# Patient Record
Sex: MALE | Race: WHITE | Employment: FULL TIME | URBAN - METROPOLITAN AREA
[De-identification: names, ages, dates, MRNs, and addresses within clinical notes are randomized per-mention and may not be internally consistent; named-entity substitution may affect disease eponyms.]

---

## 2021-06-21 ENCOUNTER — HOSPITAL ENCOUNTER (OUTPATIENT)
Age: 36
Discharge: LEFT BEFORE DISCHARGE | End: 2021-06-21
Payer: COMMERCIAL

## 2021-06-21 ENCOUNTER — HOSPITAL ENCOUNTER (EMERGENCY)
Facility: HOSPITAL | Age: 36
Discharge: HOME | End: 2021-06-21
Attending: PEDIATRICS
Payer: COMMERCIAL

## 2021-06-21 VITALS
DIASTOLIC BLOOD PRESSURE: 79 MMHG | BODY MASS INDEX: 29.8 KG/M2 | SYSTOLIC BLOOD PRESSURE: 123 MMHG | WEIGHT: 220 LBS | HEIGHT: 72 IN | HEART RATE: 94 BPM | OXYGEN SATURATION: 98 % | RESPIRATION RATE: 18 BRPM | TEMPERATURE: 98 F

## 2021-06-21 DIAGNOSIS — S71.111A LACERATION OF RIGHT THIGH, INITIAL ENCOUNTER: Primary | ICD-10-CM

## 2021-06-21 PROCEDURE — 12001 RPR S/N/AX/GEN/TRNK 2.5CM/<: CPT

## 2021-06-21 PROCEDURE — 99282 EMERGENCY DEPT VISIT SF MDM: CPT

## 2021-06-21 NOTE — ED INITIAL ASSESSMENT (HPI)
Laceration to right thigh after a glass from the microwave broke into pieces.  Tetanus UTD from 2 years ago

## 2021-06-21 NOTE — ED PROVIDER NOTES
Patient Seen in: [facility] Emergency Department      History   Patient presents with:  Laceration/Abrasion    Stated Complaint: laceration to thigh last night    HPI/Subjective:   HPI    25-year-old male here with right thigh laceration.   He was he tenderness. Cervical back: Normal range of motion and neck supple. Comments: Right mid anterior thigh with 1 cm linear superficial laceration. No tenderness   Skin:     General: Skin is warm. Coloration: Skin is not pale.    Neurological: & PLAN:    39year old male with thigh laceration, easily repaired. I have considered other serious etiologies for this patient's complaints, however the presentation is not consistent with such entities.  Patient or caregiver understands the course of e